# Patient Record
Sex: FEMALE | Race: OTHER | HISPANIC OR LATINO
[De-identification: names, ages, dates, MRNs, and addresses within clinical notes are randomized per-mention and may not be internally consistent; named-entity substitution may affect disease eponyms.]

---

## 2019-10-21 ENCOUNTER — RESULT REVIEW (OUTPATIENT)
Age: 59
End: 2019-10-21

## 2019-10-21 ENCOUNTER — APPOINTMENT (OUTPATIENT)
Dept: HEMATOLOGY ONCOLOGY | Facility: CLINIC | Age: 59
End: 2019-10-21
Payer: COMMERCIAL

## 2019-10-21 VITALS
RESPIRATION RATE: 18 BRPM | BODY MASS INDEX: 28.53 KG/M2 | WEIGHT: 161 LBS | HEIGHT: 62.99 IN | SYSTOLIC BLOOD PRESSURE: 124 MMHG | TEMPERATURE: 98.6 F | DIASTOLIC BLOOD PRESSURE: 75 MMHG | OXYGEN SATURATION: 99 % | HEART RATE: 90 BPM

## 2019-10-21 DIAGNOSIS — M89.9 DISORDER OF BONE, UNSPECIFIED: ICD-10-CM

## 2019-10-21 DIAGNOSIS — Z81.8 FAMILY HISTORY OF OTHER MENTAL AND BEHAVIORAL DISORDERS: ICD-10-CM

## 2019-10-21 DIAGNOSIS — D70.9 NEUTROPENIA, UNSPECIFIED: ICD-10-CM

## 2019-10-21 PROBLEM — Z00.00 ENCOUNTER FOR PREVENTIVE HEALTH EXAMINATION: Status: ACTIVE | Noted: 2019-10-21

## 2019-10-21 PROCEDURE — 99215 OFFICE O/P EST HI 40 MIN: CPT

## 2019-10-22 PROBLEM — Z81.8 FAMILY HISTORY OF DEMENTIA: Status: ACTIVE | Noted: 2019-10-22

## 2019-10-22 RX ORDER — IBUPROFEN 800 MG
TABLET ORAL
Refills: 0 | Status: ACTIVE | COMMUNITY

## 2019-10-22 NOTE — ASSESSMENT
[FreeTextEntry1] : 60 yo who incidentally was found to have lucent bone lesions on CXR concerning for MM.\par We have briefly reviewed the diagnosis, prognosis and natural history of MM however we do not have a diagnosis.\par Will check CBC with diff, CMP, LDH, B2 Microglobulin, SPEP with WILLIAM, UPEP with WILLIAM, Bence blackwood proteins, immunoglobulin free light chains and skeletal survey.\par If these test come back concerning for MM will do BM bx at next visit.\par \par Given neutropenia on labs will r/o hepatitis, THADDEUS, B12/FOLATE.\par \par RTC in 2 weeks to discuss findings of work up.

## 2019-10-22 NOTE — REASON FOR VISIT
[Initial Consultation] : an initial consultation for [FreeTextEntry2] : lucent bone lesions concerning for MM

## 2019-10-22 NOTE — HISTORY OF PRESENT ILLNESS
[de-identified] : patient is a 58 yo F with no sig pmhx who presented to the ED with RUQ pain. CXR was obtained that showed multiple lucent lesions of bone. Denies fevers, chills, nausea, vomiting, diarrhea, constipation, abd pain, chest pain, palpitations, sob, cough, dizziness, headaches, vision changes, neuropathy, or bone pain.\par

## 2019-10-23 ENCOUNTER — OTHER (OUTPATIENT)
Age: 59
End: 2019-10-23

## 2019-11-04 ENCOUNTER — APPOINTMENT (OUTPATIENT)
Dept: HEMATOLOGY ONCOLOGY | Facility: CLINIC | Age: 59
End: 2019-11-04

## 2019-11-27 ENCOUNTER — MESSAGE (OUTPATIENT)
Age: 59
End: 2019-11-27

## 2024-05-19 ENCOUNTER — NON-APPOINTMENT (OUTPATIENT)
Age: 64
End: 2024-05-19